# Patient Record
Sex: FEMALE
[De-identification: names, ages, dates, MRNs, and addresses within clinical notes are randomized per-mention and may not be internally consistent; named-entity substitution may affect disease eponyms.]

---

## 2024-09-06 ENCOUNTER — APPOINTMENT (OUTPATIENT)
Dept: RHEUMATOLOGY | Facility: CLINIC | Age: 64
End: 2024-09-06
Payer: COMMERCIAL

## 2024-09-06 VITALS
OXYGEN SATURATION: 99 % | HEIGHT: 66 IN | HEART RATE: 75 BPM | DIASTOLIC BLOOD PRESSURE: 74 MMHG | BODY MASS INDEX: 28.45 KG/M2 | WEIGHT: 177 LBS | TEMPERATURE: 98.2 F | SYSTOLIC BLOOD PRESSURE: 126 MMHG

## 2024-09-06 DIAGNOSIS — Z78.9 OTHER SPECIFIED HEALTH STATUS: ICD-10-CM

## 2024-09-06 DIAGNOSIS — M81.0 AGE-RELATED OSTEOPOROSIS W/OUT CURRENT PATHOLOGICAL FRACTURE: ICD-10-CM

## 2024-09-06 DIAGNOSIS — Z85.3 PERSONAL HISTORY OF MALIGNANT NEOPLASM OF BREAST: ICD-10-CM

## 2024-09-06 PROBLEM — Z00.00 ENCOUNTER FOR PREVENTIVE HEALTH EXAMINATION: Status: ACTIVE | Noted: 2024-09-06

## 2024-09-06 PROCEDURE — G2211 COMPLEX E/M VISIT ADD ON: CPT

## 2024-09-06 PROCEDURE — 99205 OFFICE O/P NEW HI 60 MIN: CPT

## 2024-09-06 RX ORDER — ALENDRONATE SODIUM 70 MG/1
70 TABLET ORAL
Qty: 12 | Refills: 1 | Status: ACTIVE | COMMUNITY
Start: 2024-09-06 | End: 1900-01-01

## 2024-09-06 RX ORDER — MELOXICAM 15 MG/1
15 TABLET ORAL
Refills: 0 | Status: ACTIVE | COMMUNITY

## 2024-09-06 RX ORDER — CALCIUM CARBONATE 260MG(650)
TABLET,CHEWABLE ORAL
Refills: 0 | Status: ACTIVE | COMMUNITY

## 2024-09-06 RX ORDER — MAGNESIUM 200 MG
200 TABLET ORAL
Refills: 0 | Status: ACTIVE | COMMUNITY

## 2024-09-06 RX ORDER — ROSUVASTATIN CALCIUM 5 MG/1
5 TABLET, FILM COATED ORAL
Refills: 0 | Status: ACTIVE | COMMUNITY

## 2024-09-06 RX ORDER — ACETAMINOPHEN 500 MG
500 TABLET ORAL
Refills: 0 | Status: ACTIVE | COMMUNITY

## 2024-09-06 NOTE — ASSESSMENT
[FreeTextEntry1] : 64 y old F with PMH of Breast CA s/p lumpectomy x4 , RT and Tamoxifen for 5 years, with episode of L LE rash after bug bite with biopsy possible autoimmune dx vs morphea resolved in 6-7 month , no recurrent rash since then  denies photosensitive rash with workup negative SHEBA, RF and normal ESR and CRP no other symptoms of SLE or systemic connective tissue disease , with workup also had DEXA 5/24 showed osteoporosis  and high risk hip fracture due to OP  -one episode of rash after bug bite 7/23 no similar rash in the past no photosensitive rash healed 6-7 month with topical tx, biopsy showed possible autoimmune vs morphea - evaluated by Dermatology [facility] advised to send us biopsy results, no systemic symptoms of autoimmune disease negative SHEBA, RF and normal ERS and CRP , monitor for recurrent rash, no history of psoriasis either  -DEXA with hip neck -3.1 T score consistent with OP, suggested tx with diplosomites , discussed side affects , cw calcium and vit D, normal calcium and vit D 41  -start Fosamax 70 mg weekly , discussed detailed instructions, no major dental work needed advised to discuss with Dental -planned for R knee replacement, followed by [facility] orthopedics for Knee and Hip DJD, advised to discuss that we are starting OP tx -follow up 2-3 month

## 2024-09-06 NOTE — PHYSICAL EXAM
[General Appearance - In No Acute Distress] : in no acute distress [PERRL With Normal Accommodation] : pupils were equal in size, round, and reactive to light [Examination Of The Oral Cavity] : the lips and gums were normal [Oropharynx] : the oropharynx was normal [Respiration, Rhythm And Depth] : normal respiratory rhythm and effort [Auscultation Breath Sounds / Voice Sounds] : lungs were clear to auscultation bilaterally [Chest Palpation] : palpation of the chest revealed no abnormalities [Heart Rate And Rhythm] : heart rate was normal and rhythm regular [Heart Sounds] : normal S1 and S2 [Murmurs] : no murmurs [Edema] : there was no peripheral edema [Bowel Sounds] : normal bowel sounds [Abdomen Soft] : soft [Abdomen Tenderness] : non-tender [Femoral Lymph Nodes Enlarged Bilaterally] : femoral [Inguinal Lymph Nodes Enlarged Bilaterally] : inguinal [No Spinal Tenderness] : no spinal tenderness [Musculoskeletal - Swelling] : no joint swelling seen [Motor Tone] : muscle strength and tone were normal [] : no rash [Sensation] : the sensory exam was normal to light touch and pinprick [Oriented To Time, Place, And Person] : oriented to person, place, and time

## 2024-09-06 NOTE — HISTORY OF PRESENT ILLNESS
[FreeTextEntry1] : CC: evaluation for autoimmune, DEXA   HPI: 64 y old  F with PMH Breast R Dx 2002 s/p lumpectomy x 4 , RT, in remission s/p Tamoxifen for 5 years,  HLD, R knee DJD planned for surgery and L hip followed by Orthopedic surgery at [facility]  planned for  L hip steroid injection 10/12/24   7/23 after bug bite rash on L LE with biopsy showed possible morphea vs autoimmune, negative SHEBA and RF negative  ESR and CRP normal - resolved in 7 month   no photosensitive rash , no oral ulcers, no hair loss, no so sob no chest pain, no ryanoid, no sicca symptoms, no abd  pain, no dysphagia, no recurrent infections, no diarrhea, blood in stool or urine  no skin thickening   ALL  NCEF - rash   SH : no toxic habits   Medications : Meloxicam as needed  Crestor 5 mg daily

## 2024-09-06 NOTE — DATA REVIEWED
[FreeTextEntry1] : negative SHEBA, RF,  normal ESR and CRP normal Vit D 41 normal calcium  DEXA  -3.1 left hip total -3.2    5/31/24

## 2024-09-09 ENCOUNTER — TRANSCRIPTION ENCOUNTER (OUTPATIENT)
Age: 64
End: 2024-09-09

## 2024-12-04 ENCOUNTER — APPOINTMENT (OUTPATIENT)
Dept: RHEUMATOLOGY | Facility: CLINIC | Age: 64
End: 2024-12-04
Payer: COMMERCIAL

## 2024-12-04 DIAGNOSIS — M19.90 UNSPECIFIED OSTEOARTHRITIS, UNSPECIFIED SITE: ICD-10-CM

## 2024-12-04 PROCEDURE — 99215 OFFICE O/P EST HI 40 MIN: CPT

## 2024-12-04 PROCEDURE — G2211 COMPLEX E/M VISIT ADD ON: CPT

## 2024-12-04 RX ORDER — DICLOFENAC SODIUM 10 MG/G
1 GEL TOPICAL DAILY
Qty: 1 | Refills: 3 | Status: ACTIVE | COMMUNITY
Start: 2024-12-04 | End: 1900-01-01

## 2025-05-01 ENCOUNTER — TRANSCRIPTION ENCOUNTER (OUTPATIENT)
Age: 65
End: 2025-05-01

## 2025-06-18 ENCOUNTER — RX RENEWAL (OUTPATIENT)
Age: 65
End: 2025-06-18